# Patient Record
Sex: FEMALE | Race: WHITE
[De-identification: names, ages, dates, MRNs, and addresses within clinical notes are randomized per-mention and may not be internally consistent; named-entity substitution may affect disease eponyms.]

---

## 2017-05-19 ENCOUNTER — HOSPITAL ENCOUNTER (EMERGENCY)
Dept: HOSPITAL 33 - ED | Age: 62
LOS: 1 days | Discharge: HOME | End: 2017-05-20
Payer: MEDICARE

## 2017-05-19 DIAGNOSIS — F33.1: Primary | ICD-10-CM

## 2017-05-19 DIAGNOSIS — Z79.84: ICD-10-CM

## 2017-05-19 DIAGNOSIS — Z79.899: ICD-10-CM

## 2017-05-19 DIAGNOSIS — N39.0: ICD-10-CM

## 2017-05-19 DIAGNOSIS — E11.9: ICD-10-CM

## 2017-05-19 LAB
ALBUMIN SERPL-MCNC: 3.6 G/DL (ref 3.4–5)
ALP SERPL-CCNC: 65 U/L (ref 46–116)
ALT SERPL-CCNC: 41 U/L (ref 12–78)
ANION GAP SERPL CALC-SCNC: 12.5 MEQ/L (ref 5–15)
APAP SPEC-MCNC: 4.8 UG/ML (ref 10–30)
AST SERPL QL: 31 U/L (ref 15–37)
BASOPHILS NFR BLD AUTO: 0.5 % (ref 0–0.4)
BILIRUB BLD-MCNC: 0.4 MG/DL (ref 0.2–1)
BUN SERPL-MCNC: 15 MG/DL (ref 9–20)
CHLORIDE SERPL-SCNC: 104 MEQ/L (ref 98–107)
CO2 SERPL-SCNC: 27.6 MEQ/L (ref 21–32)
COLLECTION TYPE: (no result)
COMPLETE URINE MICROSCOPIC?: YES
GLUCOSE SERPL-MCNC: 97 MG/DL (ref 70–110)
MCH RBC QN AUTO: 30.4 PG (ref 26–32)
NEUTROPHILS NFR BLD AUTO: 65.3 % (ref 36–66)
PLATELET # BLD AUTO: 260 K/MM3 (ref 150–450)
POTASSIUM SERPLBLD-SCNC: 4.8 MEQ/L (ref 3.5–5.1)
PROT SERPL-MCNC: 7.2 GM/DL (ref 6.4–8.2)
RBC # BLD AUTO: 4.73 M/MM3 (ref 4.1–5.4)
SODIUM SERPL-SCNC: 139 MEQ/L (ref 136–145)
WBC # BLD AUTO: 6.1 K/MM3 (ref 4–10.5)
WBC URNS QL MICRO: (no result) /HPF (ref 0–5)

## 2017-05-19 PROCEDURE — 99285 EMERGENCY DEPT VISIT HI MDM: CPT

## 2017-05-19 PROCEDURE — 87086 URINE CULTURE/COLONY COUNT: CPT

## 2017-05-19 PROCEDURE — 80053 COMPREHEN METABOLIC PANEL: CPT

## 2017-05-19 PROCEDURE — 81000 URINALYSIS NONAUTO W/SCOPE: CPT

## 2017-05-19 PROCEDURE — 80307 DRUG TEST PRSMV CHEM ANLYZR: CPT

## 2017-05-19 PROCEDURE — 90791 PSYCH DIAGNOSTIC EVALUATION: CPT

## 2017-05-19 PROCEDURE — 85025 COMPLETE CBC W/AUTO DIFF WBC: CPT

## 2017-05-19 PROCEDURE — 80320 DRUG SCREEN QUANTALCOHOLS: CPT

## 2017-05-19 PROCEDURE — 36415 COLL VENOUS BLD VENIPUNCTURE: CPT

## 2017-05-19 PROCEDURE — 83986 ASSAY PH BODY FLUID NOS: CPT

## 2017-05-19 NOTE — ERPHSYRPT
- History of Present Illness


Source: patient, old records, other (LMD)


Exam Limitations: no limitations


Patient Subjective Stated Complaint: PATIENT STATES FEELING SUICIDAL FOR A 

YEAR.  SAW DR. PETERSON TODAY AND WANTED TO TRY DIFFERENT DEPRESSION MED.  PATIENT 

TOLD DR. PETERSON THAT SHE HAD A PLAN TO CUT HER WRISTS IN THE BATHTUB.


Triage Nursing Assessment: TO ROOM PER SELF.  PATIENT COOPERATIVE, A/O, SKIN W/D

, COLOR NORMAL.  STATES HAS BEEN HAVING SUICIDAL THOUGHTS FOR ONE YEAR AND 

WANTED TO GET MEDS CHANGED TODAY AT DR'S OFFICE.  TOLD DR. PETERSON SHE HAD A PLAN 

TO CUT HER WRISTS AND DR. PETERSON WANTED HER SEEN IN ER


Timing/Duration: today (expressed suicidal plans to LMD and staff), week(s) (

worsening of depression), constant (depression)


Severity of Symptoms-Max: severe


Severity of Symptoms-Current: moderate


Context related to: living circumstances, other (life in general)


Suicidal thoughts: specific plan


Associated Symptoms: depressed


Previous symptoms: same symptoms as today, recently seen


Hx Tetanus, Diphtheria Vaccination/Date Given: No


Hx Influenza Vaccination/Date Given: No


Hx Pneumococcal Vaccination/Date Given: No





<SILAS CALLAHAN - Last Filed: 05/19/17 18:04>





<CRISELDA ANDRADE - Last Filed: 05/19/17 23:27>





- History of Present Illness


Time Seen by Provider: 05/19/17 15:23


Physician History: 





patient brought to ED by office staff from her LMD because she was depressed in 

the office and stated she had a plan - " I'm going to get in the bath tub and 

open my veins ( i.e. cut  my wrists).  She states she has been depressed for 

years; her current meds is not helping and hasn't helped for months; no history 

of suicidal attempts but states she has had the plan for years, just never 

acted on it.  She is not sure if she is rady to act on it now or not; no other 

acute complaints; became more depressed when recently diagnosed with DM


 (SILAS CALLAHAN)


Allergies/Adverse Reactions: 








ibuprofen [From Motrin] Adverse Reaction (Intermediate, Unverified 05/02/12 21:

02)


 abdominal pain


NSAIDS (Non-Steroidal Anti-Inflamma Adverse Reaction (Intermediate, Unverified 

05/02/12 21:03)


 abdominal pain


acetaminophen [From Tylox] Adverse Reaction (Mild, Unverified 05/02/12 21:03)


 Nausea


oxycodone HCl [From Tylox] Adverse Reaction (Mild, Unverified 05/02/12 21:03)


 Nausea





Home Medications: 








Citalopram Hydrobromide 20 mg* [ceLEXa 20 MG] 60 mg PO HS 05/02/12 [History]


Hydrocodone Bit/Acetaminophen [Vicodin Hp Tablet] 1 each PO Q6H PRN PRN 05/02/ 12 [History]


Lisinopril 20 mg PO BID 05/02/12 [History]


Magnesium 400 mg PO DAILY 05/02/12 [History]


Omeprazole 20 MG [Prilosec 20 mg] 40 mg PO DAILY 05/02/12 [History]


Potassium Chloride 20 Meq [Klor-Con 20 MEQ] 40 meq PO BID 05/02/12 [History]


Alprazolam 1 mg*** [Xanax 1 mg***] 1 mg PO QID 05/19/17 [History]


Furosemide 40 mg*** [Lasix 40 MG***] 40 mg PO DAILY 05/19/17 [History]


Metformin HCl 500 mg*** [Glucophage 500 MG***] 500 mg PO BID 05/19/17 [History]








- Past Medical History


Pertinent Past Medical History: Yes


Cardiac History: Hypertension


Endocrine Medical History: Diabetes Type II


Musculoskeletal History: Osteoarthritis, Other


Psycho-Social History: Anxiety, Depression


Other Medical History: Chronic Pain





- Past Surgical History


Past Surgical History: Yes


Gastrointestinal: Cholecystectomy


Musculoskeletal: Orthopedic Surgery


Other Surgical History: BOTH KNEES





- Social History


Smoking Status: Current every day smoker


How long have you smoked: 22 years


Exposure to second hand smoke: Yes


Alcohol Use: None


Drug Use: none


Patient Lives Alone: Yes


Significant Family History: no pertinent family hx





- Female History


Hx Pregnant Now: No





<SILAS CALLAHAN - Last Filed: 05/19/17 18:04>





- Review of Systems


Constitutional: No Symptoms


Eyes: No Symptoms


Ears, Nose, & Throat: No Symptoms


Respiratory: No Cough, No Dyspnea, No Wheezing


Cardiac: No Chest Pain, No Palpitations, No Syncope


Abdominal/Gastrointestinal: No Abdominal Pain, No Nausea, No Vomiting, No 

Diarrhea


Genitourinary Symptoms: No Dysuria, No Frequency, No Hematuria


Musculoskeletal: No Symptoms


Skin: No Symptoms


Neurological: No Symptoms


Psychological: Depression, Suicidal Ideations, No Alcohol Abuse, No Drug Abuse, 

No Anxiety, No Hallucinations


Endocrine: Polyuria, Polydipsia


Hematologic/Lymphatic: No Symptoms


Immunological/Allergic: No Symptoms





<SILAS CALLAHAN - Last Filed: 05/19/17 18:04>





- Physical Exam


General Appearance: mild distress, alert


Eyes, Ears, Nose, Throat Exam: normal ENT inspection, TMs normal, pharynx normal

, moist mucous membranes


Neck Exam: normal inspection, non-tender, supple, full range of motion, No 

Kernig's, No JVD


Respiratory Exam: normal breath sounds, lungs clear, airway intact, No chest 

tenderness, No respiratory distress


Cardiovascular Exam: regular rate/rhythm, normal heart sounds, normal 

peripheral pulses, capillary refill <2 sec, No murmur


Gastrointestinal/Abdominal Exam: soft, normal bowel sounds, No tenderness, No 

guarding, No rebound, No organomegaly


Extremities Exam: normal inspection, normal range of motion, No evidence of 

injury, No edema


Peripheral Pulses: carotid (R): 4+, carotid (L): 4+, femoral (R): 4+, femoral (L

): 4+


Current Suicidality: has suicide plan


Neurological Exam: alert, calm, CNs II-XII nml as tested, oriented x 3, 

responds to pain, depressed affect


Appearance: appropriate appearance, appropriate insight, no memory impairment


Behavior/Eye Contact/Speech: alert & cooperative, cooperative, good eye contact

, normal speech


Thoughts/Hallucinations: normal thought pattern, no apparent hallucination, No 

delusions, No flight of ideas, No grandiose, No paranoid


Skin Exam: normal color, warm, dry, No rash


**SpO2 Interpretation**: normal


SpO2: 98


Oxygen Delivery: Room Air





<SILAS CALLAHAN - Last Filed: 05/19/17 18:04>





- Course


Nursing assessment & vital signs reviewed: Yes





<SILAS CALLAHAN - Last Filed: 05/19/17 18:04>





<CRISELDA ANDRADE - Last Filed: 05/19/17 23:27>


Ordered Tests: 


 Active Orders 24 hr











 Category Date Time Status


 


 Accucheck STAT Care  05/19/17 15:22 Completed


 


 Psychiatric Evaluation STAT Care  05/19/17 15:22 Active


 


 Re-Check Vital Signs STAT Care  05/19/17 15:22 Completed


 


 Regular Diet Diet  05/19/17 Breakfast Active


 


 ACETAMINOPHEN Stat Lab  05/19/17 15:43 Completed


 


 CBC W DIFF Stat Lab  05/19/17 15:43 Completed


 


 CMP Stat Lab  05/19/17 15:43 Completed


 


 CULTURE,URINE Stat Lab  05/19/17 15:22 Received


 


 Ethyl Alcohol,Urine Stat Lab  05/19/17 15:35 Completed


 


 SALICYLATE Stat Lab  05/19/17 15:43 Completed


 


 UA W/ MICROSCOPIC Stat Lab  05/19/17 15:35 Completed


 


 Urine Triage Profile Stat Lab  05/19/17 15:35 Completed








Medication Summary











Generic Name Dose Route Start Last Admin





  Trade Name Freq  PRN Reason Stop Dose Admin


 


Nitrofurantoin Macrocrystals  100 mg  05/19/17 23:21  





  Macrobid 100mg Capsule***  PO  05/19/17 23:22  





  STAT ONE   











Lab/Rad Data: 


 Laboratory Result Diagrams





 05/19/17 15:43 





 05/19/17 15:43 





 Laboratory Results











  05/19/17 05/19/17 05/19/17 Range/Units





  15:43 15:43 15:35 


 


WBC   6.1   (4.0-10.5)  K/mm3


 


RBC   4.73   (4.1-5.4)  M/mm3


 


Hgb   14.4   (12.0-16.0)  gm/dl


 


Hct   45.4   (35-47)  %


 


MCV   96.0   ()  fl


 


MCH   30.4   (26-32)  pg


 


MCHC   31.7 L   (32-36)  g/dl


 


RDW   13.4   (11.5-14.0)  %


 


Plt Count   260   (150-450)  K/mm3


 


MPV   9.2   (6-9.5)  fl


 


Gran %   65.3   (36.0-66.0)  %


 


Lymphocytes %   23.0 L   (24.0-44.0)  %


 


Monocytes %   8.8   (0.0-12.0)  %


 


Eosinophils %   2.4   (0.00-5.0)  %


 


Basophils %   0.5   (0.0-0.4)  %


 


Basophils #   0.03   (0-0.4)  


 


Sodium  139    (136-145)  mEq/L


 


Potassium  4.8    (3.5-5.1)  mEq/L


 


Chloride  104    ()  mEq/L


 


Carbon Dioxide  27.6    (21-32)  mEq/L


 


Anion Gap  12.5    (5-15)  MEQ/L


 


BUN  15    (9-20)  mg/dL


 


Creatinine  1.10    (0.55-1.30)  mg/dl


 


Estimated GFR  54    ML/MIN


 


Glucose  97    ()  MG/DL


 


Calcium  10.3 H    (8.5-10.1)  mg/dL


 


Total Bilirubin  0.4    (0.2-1.0)  mg/dL


 


AST  31    (15-37)  U/L


 


ALT  41    (12-78)  U/L


 


Alkaline Phosphatase  65    ()  U/L


 


Serum Total Protein  7.2    (6.4-8.2)  gm/dL


 


Albumin  3.6    (3.4-5.0)  g/dL


 


Ur Collection Type     


 


Urine Color     (YELLOW)  


 


Urine Appearance     (CLEAR)  


 


Urine pH    5.5  (5-6)  


 


Ur Specific Gravity     (1.005-1.025)  


 


Urine Protein     (Negative)  


 


Urine Glucose (UA)     (NEGATIVE)  mg/dL


 


Urine Ketones     (NEGATIVE)  


 


Urine Nitrite     (NEGATIVE)  


 


Urine Bilirubin     (NEGATIVE)  


 


Urine Urobilinogen     (0-1)  mg/dL


 


Urine WBC (Auto)     (NEGATIVE)  


 


Urine RBC (Auto)     (0-5)  Amado/ul


 


Urine Microscopic RBC     (0-2)  /HPF


 


Urine Microscopic WBC     (0-5)  /HPF


 


Ur Epithelial Cells     (FEW)  /HPF


 


Urine Bacteria     (NEGATIVE)  /HPF


 


Urine Yeast     (NEGATIVE)  /HPF


 


Salicylates  4.0    (2.8-20.0)  mg/dl


 


Urine Opiates Level     (NEGATIVE)  


 


Ur Methadone     (NEGATIVE)  


 


Acetaminophen  4.8 L    (10-30)  ug/ml


 


Urine Barbiturates     (NEGATIVE)  


 


Ur Phencyclidine (PCP)     (NEGATIVE)  


 


Urine Amphetamine     (NEGATIVE)  


 


U Benzodiazepine Level     (NEGATIVE)  


 


Urine Cocaine     (NEGATIVE)  


 


Urine Marijuana (THC)     (NEGATIVE)  


 


Urine Ethyl Alcohol    3  (0.00-20)  mg/dl


 


Specimen Received     














  05/19/17 05/19/17 Range/Units





  15:35 15:35 


 


WBC    (4.0-10.5)  K/mm3


 


RBC    (4.1-5.4)  M/mm3


 


Hgb    (12.0-16.0)  gm/dl


 


Hct    (35-47)  %


 


MCV    ()  fl


 


MCH    (26-32)  pg


 


MCHC    (32-36)  g/dl


 


RDW    (11.5-14.0)  %


 


Plt Count    (150-450)  K/mm3


 


MPV    (6-9.5)  fl


 


Gran %    (36.0-66.0)  %


 


Lymphocytes %    (24.0-44.0)  %


 


Monocytes %    (0.0-12.0)  %


 


Eosinophils %    (0.00-5.0)  %


 


Basophils %    (0.0-0.4)  %


 


Basophils #    (0-0.4)  


 


Sodium    (136-145)  mEq/L


 


Potassium    (3.5-5.1)  mEq/L


 


Chloride    ()  mEq/L


 


Carbon Dioxide    (21-32)  mEq/L


 


Anion Gap    (5-15)  MEQ/L


 


BUN    (9-20)  mg/dL


 


Creatinine    (0.55-1.30)  mg/dl


 


Estimated GFR    ML/MIN


 


Glucose    ()  MG/DL


 


Calcium    (8.5-10.1)  mg/dL


 


Total Bilirubin    (0.2-1.0)  mg/dL


 


AST    (15-37)  U/L


 


ALT    (12-78)  U/L


 


Alkaline Phosphatase    ()  U/L


 


Serum Total Protein    (6.4-8.2)  gm/dL


 


Albumin    (3.4-5.0)  g/dL


 


Ur Collection Type   VOID  


 


Urine Color   YELLOW  (YELLOW)  


 


Urine Appearance   SLIGHTLY CLOUDY  (CLEAR)  


 


Urine pH   5.5  (5-6)  


 


Ur Specific Gravity   1.025  (1.005-1.025)  


 


Urine Protein   TRACE  (Negative)  


 


Urine Glucose (UA)   500  (NEGATIVE)  mg/dL


 


Urine Ketones   NEGATIVE  (NEGATIVE)  


 


Urine Nitrite   NEGATIVE  (NEGATIVE)  


 


Urine Bilirubin   NEGATIVE  (NEGATIVE)  


 


Urine Urobilinogen   0.2  (0-1)  mg/dL


 


Urine WBC (Auto)   NEGATIVE  (NEGATIVE)  


 


Urine RBC (Auto)   TRACE-INTACT  (0-5)  Amado/ul


 


Urine Microscopic RBC   0-2  (0-2)  /HPF


 


Urine Microscopic WBC   15-25  (0-5)  /HPF


 


Ur Epithelial Cells   MANY  (FEW)  /HPF


 


Urine Bacteria   MODERATE  (NEGATIVE)  /HPF


 


Urine Yeast   FEW  (NEGATIVE)  /HPF


 


Salicylates    (2.8-20.0)  mg/dl


 


Urine Opiates Level  POS.   (NEGATIVE)  


 


Ur Methadone  NEG.   (NEGATIVE)  


 


Acetaminophen    (10-30)  ug/ml


 


Urine Barbiturates  NEG.   (NEGATIVE)  


 


Ur Phencyclidine (PCP)  NEG.   (NEGATIVE)  


 


Urine Amphetamine  NEG.   (NEGATIVE)  


 


U Benzodiazepine Level  POS.   (NEGATIVE)  


 


Urine Cocaine  NEG.   (NEGATIVE)  


 


Urine Marijuana (THC)  NEG.   (NEGATIVE)  


 


Urine Ethyl Alcohol    (0.00-20)  mg/dl


 


Specimen Received   05/19/17 1497  








reviewed


 (SILAS CALLAHAN)





- Progress


Progress: re-examined (after labs)


Discussed with .: Other (Psyche consult requested)


Counseled pt/family regarding: lab results, diagnosis, need for follow-up





<SILAS CALLAHAN - Last Filed: 05/19/17 18:04>





<CRISELDA ANDRADE - Last Filed: 05/19/17 23:27>





- Progress


Progress Note: 





05/19/17 18:11


no cahnge on recheck; labs ok; still has plan but doesn't plan on executing it 

at this time; psyche consult pending; disposition will be dependent on their 

recommendation; Dr Andrade here and will assume her care. (SILAS CALLAHAN)








05/19/17 22:50


Pt was initially seen per Dr Callahan. She is currently still speaking with 

[facility] behavioralist via teleconsult.





05/19/17 23:22


Spoke to Marcelina at . She advised pt stable for release. Needs psychiatrist 

follow up. Options for a psychiatrist given. Will Rx uti as well. Pt 

comfortable going home.


 (CRISELDA ANDRADE)





- Departure


Critical Care Time: No





<SILAS CALLAHAN - Last Filed: 05/19/17 18:04>





- Departure


Time of Disposition: 23:24


Departure Disposition: Home


Critical Care Time: No





<CRISELDA ANDRADE - Last Filed: 05/19/17 23:27>





- Departure


Clinical Impression: 


 UTI (urinary tract infection)





Depression


Qualifiers:


 Depression Type: major depressive disorder Major depression recurrence: 

recurrent Active/Remission status: currently active Major depression episode 

severity: moderate Qualified Code(s): F33.1 - Major depressive disorder, 

recurrent, moderate





Condition: Stable


Referrals: 


AHVEN PETERSON MD [Primary Care Provider] - 


IZZY YEE [NON-STAFF PHY W/O PRIVILEGES] - 


POONAM COLON MD [NON-STAFF PHY W/O PRIVILEGES] - 


Instructions:  Depression -- Adult


Additional Instructions: 


Rx macrobid for UTI.


Follow up with Dr Peterson.


Stay with family reno.





Call [phone] crisis line or [phone] to speak to a counsellor 

anytime.





Arrange follow up with a psychiatrist. Dr Yee [phone] or Dr Barbosa at 

[facility] are possible options.





Return for problems or concerns.





Prescriptions: 


Nitrofurantoin Macro 100 mg*** [Macrobid 100MG Capsule***] 100 mg PO BID #14 

capsule

## 2017-05-20 VITALS — OXYGEN SATURATION: 96 % | SYSTOLIC BLOOD PRESSURE: 112 MMHG | HEART RATE: 65 BPM | DIASTOLIC BLOOD PRESSURE: 65 MMHG

## 2018-05-08 ENCOUNTER — HOSPITAL ENCOUNTER (EMERGENCY)
Dept: HOSPITAL 33 - ED | Age: 63
LOS: 1 days | Discharge: ANOTHER HOSPITAL | End: 2018-05-09
Payer: MEDICARE

## 2018-05-08 DIAGNOSIS — E11.9: ICD-10-CM

## 2018-05-08 DIAGNOSIS — F32.9: Primary | ICD-10-CM

## 2018-05-08 DIAGNOSIS — R45.851: ICD-10-CM

## 2018-05-08 DIAGNOSIS — Z79.899: ICD-10-CM

## 2018-05-08 DIAGNOSIS — I10: ICD-10-CM

## 2018-05-08 DIAGNOSIS — Z79.84: ICD-10-CM

## 2018-05-08 LAB
ALBUMIN SERPL-MCNC: 4.1 G/DL (ref 3.5–5)
ALP SERPL-CCNC: 54 U/L (ref 38–126)
ALT SERPL-CCNC: 28 U/L (ref 0–35)
AMPHETAMINES UR QL: NEGATIVE
ANION GAP SERPL CALC-SCNC: 12.8 MEQ/L (ref 5–15)
APAP SPEC-MCNC: < 10 UG/ML (ref 10–30)
AST SERPL QL: 30 U/L (ref 14–36)
BARBITURATES UR QL: NEGATIVE
BASOPHILS # BLD AUTO: 0.06 10*3/UL (ref 0–0.4)
BASOPHILS NFR BLD AUTO: 1 % (ref 0–0.4)
BENZODIAZ UR QL SCN: POSITIVE
BILIRUB BLD-MCNC: 0.3 MG/DL (ref 0.2–1.3)
BUN SERPL-MCNC: 19 MG/DL (ref 7–17)
CALCIUM SPEC-MCNC: 10.6 MG/DL (ref 8.4–10.2)
CHLORIDE SERPL-SCNC: 101 MMOL/L (ref 98–107)
CO2 SERPL-SCNC: 28 MMOL/L (ref 22–30)
COCAINE UR QL SCN: NEGATIVE
CREAT SERPL-MCNC: 0.93 MG/DL (ref 0.52–1.04)
EOSINOPHIL # BLD AUTO: 0.14 10*3/UL (ref 0–0.5)
ETHANOL SERPL-MCNC: < 10 MG/DL (ref 0–10)
GLUCOSE SERPL-MCNC: 116 MG/DL (ref 74–106)
GRANULOCYTES # BLD AUTO: 3.39 10*3/UL (ref 1.4–6.9)
HCT VFR BLD AUTO: 43.8 % (ref 35–47)
HGB BLD-MCNC: 14.2 GM/DL (ref 12–16)
LYMPHOCYTES # SPEC AUTO: 1.85 10*3/UL (ref 1–4.6)
MCH RBC QN AUTO: 29.9 PG (ref 26–32)
MCHC RBC AUTO-ENTMCNC: 32.4 G/DL (ref 32–36)
METHADONE UR QL: NEGATIVE
MONOCYTES # BLD AUTO: 0.44 10*3/UL (ref 0–1.3)
NEUTROPHILS NFR BLD AUTO: 57.6 % (ref 36–66)
OPIATES UR QL: POSITIVE
PCP UR QL CFM>20 NG/ML: NEGATIVE
PLATELET # BLD AUTO: 305 K/MM3 (ref 150–450)
POTASSIUM SERPLBLD-SCNC: 4.7 MMOL/L (ref 3.5–5.1)
PROT SERPL-MCNC: 7.3 G/DL (ref 6.3–8.2)
RBC # BLD AUTO: 4.75 M/MM3 (ref 4.1–5.4)
SALICYLATES SERPL-MCNC: < 1 MG/DL (ref 2–20)
SODIUM SERPL-SCNC: 137 MMOL/L (ref 137–145)
THC UR QL SCN: NEGATIVE
WBC # BLD AUTO: 5.9 K/MM3 (ref 4–10.5)

## 2018-05-08 PROCEDURE — 99285 EMERGENCY DEPT VISIT HI MDM: CPT

## 2018-05-08 PROCEDURE — 36415 COLL VENOUS BLD VENIPUNCTURE: CPT

## 2018-05-08 PROCEDURE — 90791 PSYCH DIAGNOSTIC EVALUATION: CPT

## 2018-05-08 PROCEDURE — 85025 COMPLETE CBC W/AUTO DIFF WBC: CPT

## 2018-05-08 PROCEDURE — 93005 ELECTROCARDIOGRAM TRACING: CPT

## 2018-05-08 PROCEDURE — 80307 DRUG TEST PRSMV CHEM ANLYZR: CPT

## 2018-05-08 PROCEDURE — G0480 DRUG TEST DEF 1-7 CLASSES: HCPCS

## 2018-05-08 PROCEDURE — 80053 COMPREHEN METABOLIC PANEL: CPT

## 2018-05-08 NOTE — ERPHSYRPT
- History of Present Illness


Source: patient


Hx Tetanus, Diphtheria Vaccination/Date Given: No


Hx Influenza Vaccination/Date Given: No


Hx Pneumococcal Vaccination/Date Given: No





<ANDREEA MARIANO - Last Filed: 05/08/18 18:44>





<ISAMAR CARLISLE - Last Filed: 05/08/18 23:39>





- History of Present Illness


Time Seen by Provider: 05/08/18 15:49


Physician History: 





mild to mod suicidal ideation today, hx depression, plans to hurt herself w/ 

razor blades, poor eye contact, speech fluent, denies any pain (ANDREEA MARIANO)


Allergies/Adverse Reactions: 








ibuprofen [From Motrin] Adverse Reaction (Intermediate, Verified 05/08/18 16:32)


 abdominal pain


NSAIDS (Non-Steroidal Anti-Inflamma Adverse Reaction (Intermediate, Verified 05/ 08/18 16:32)


 abdominal pain


acetaminophen [From Tylox] Adverse Reaction (Mild, Verified 05/08/18 16:32)


 Nausea


oxycodone HCl [From Tylox] Adverse Reaction (Mild, Verified 05/08/18 16:32)


 Nausea





Home Medications: 








Citalopram Hydrobromide 20 mg* [ceLEXa 20 MG] 60 mg PO HS 05/02/12 [History]


Hydrocodone Bit/Acetaminophen [Vicodin Hp Tablet] 1 each PO Q6H PRN PRN 05/02/ 12 [History]


Lisinopril 20 mg PO BID 05/02/12 [History]


Omeprazole 20 MG [Prilosec 20 mg] 40 mg PO DAILY 05/02/12 [History]


Potassium Chloride 20 Meq [Klor-Con 20 MEQ] 40 meq PO BID 05/02/12 [History]


Alprazolam 1 mg*** [Xanax 1 mg***] 1 mg PO QID 05/19/17 [History]


Metformin HCl 500 mg*** [Glucophage 500 MG***] 500 mg PO BID 05/19/17 [History]


Dapagliflozin Propanediol [Farxiga] 10 mg PO DAILY 05/08/18 [History]


Potassium Chloride [Klor-Con M10] 10 meq PO DAILY 05/08/18 [History]


Pravastatin Sodium [Pravastatin Sodium] 40 mg PO DAILY 05/08/18 [History]


Quetiapine Fumarate [Quetiapine Fumarate] 50 mg PO HS 05/08/18 [History]


hydroCHLOROthiazide [Hydrochlorothiazide] 12.5 mg PO DAILY 05/08/18 [History]








- Past Medical History


Pertinent Past Medical History: Yes


Cardiac History: Hypertension


Endocrine Medical History: Diabetes Type II


Musculoskeletal History: Osteoarthritis, Other


Psycho-Social History: Anxiety, Depression


Other Medical History: Chronic Pain





- Past Surgical History


Past Surgical History: Yes


Gastrointestinal: Cholecystectomy


Musculoskeletal: Orthopedic Surgery


Other Surgical History: BOTH KNEES





- Social History


Smoking Status: Current every day smoker


How long have you smoked: 22 years


Exposure to second hand smoke: Yes


Alcohol Use: None


Drug Use: none


Patient Lives Alone: Yes


Significant Family History: no pertinent family hx





<ANDREEA MARIANO - Last Filed: 05/08/18 18:44>





- Review of Systems


Constitutional: No Fever


Ears, Nose, & Throat: No Mouth Pain


Respiratory: No Dyspnea


Cardiac: No Chest Pain


Abdominal/Gastrointestinal: No Abdominal Pain


Musculoskeletal: No Back Pain, No Neck Pain


Skin: No Skin Lesions


Neurological: No Dizziness, No Focal Weakness, No Headache


Psychological: Depression, Suicidal Ideations





<ANDREEA MARIANO - Last Filed: 05/08/18 18:44>





- Physical Exam


General Appearance: no apparent distress


Eyes, Ears, Nose, Throat Exam: moist mucous membranes


Neck Exam: non-tender


Respiratory Exam: normal breath sounds


Cardiovascular Exam: regular rate/rhythm


Gastrointestinal/Abdominal Exam: soft, No tenderness


Neurological Exam: alert, oriented x 3, flat


Appearance: appropriate appearance


Behavior/Eye Contact/Speech: cooperative, normal speech


Thoughts/Hallucinations: No auditory hallucinations


Skin Exam: warm, dry





<ANDREEA MARIANO - Last Filed: 05/08/18 18:44>





- Nursing Vital Signs


Nursing Vital Signs: 


 Initial Vital Signs











Temperature  99.2 F   05/08/18 15:56


 


Pulse Rate  85   05/08/18 15:56


 


Respiratory Rate  16   05/08/18 15:56


 


Blood Pressure  149/110   05/08/18 15:56


 


O2 Sat by Pulse Oximetry  94 L  05/08/18 15:56








 Pain Scale











Pain Intensity                 0














Ordered Tests: 


 Active Orders 24 hr











 Category Date Time Status


 


 EKG-ER Only STAT Care  05/08/18 15:45 Active


 


 ACETAMINOPHEN Stat Lab  05/08/18 16:20 Completed


 


 CBC W DIFF Stat Lab  05/08/18 16:20 Completed


 


 CMP Stat Lab  05/08/18 16:20 Completed


 


 ETHYL ALCOHOL Stat Lab  05/08/18 16:20 Completed


 


 SALICYLATE Stat Lab  05/08/18 16:20 Completed


 


 Urine Triage Profile Stat Lab  05/08/18 16:25 Completed











Lab/Rad Data: 


 Laboratory Result Diagrams





 05/08/18 16:20 





 05/08/18 16:20 





 Laboratory Results











  05/08/18 05/08/18 05/08/18 Range/Units





  16:25 16:20 16:20 


 


WBC    5.9  (4.0-10.5)  K/mm3


 


RBC    4.75  (4.1-5.4)  M/mm3


 


Hgb    14.2  (12.0-16.0)  gm/dl


 


Hct    43.8  (35-47)  %


 


MCV    92.2  ()  fl


 


MCH    29.9  (26-32)  pg


 


MCHC    32.4  (32-36)  g/dl


 


RDW    13.2  (11.5-14.0)  %


 


Plt Count    305  (150-450)  K/mm3


 


MPV    9.0  (6-9.5)  fl


 


Gran %    57.6  (36.0-66.0)  %


 


Eos # (Auto)    0.14  (0-0.5)  


 


Absolute Lymphs (auto)    1.85  (1.0-4.6)  


 


Absolute Monos (auto)    0.44  (0.0-1.3)  


 


Lymphocytes %    31.5  (24.0-44.0)  %


 


Monocytes %    7.5  (0.0-12.0)  %


 


Eosinophils %    2.4  (0.00-5.0)  %


 


Basophils %    1.0  (0.0-0.4)  %


 


Absolute Granulocytes    3.39  (1.4-6.9)  


 


Basophils #    0.06  (0-0.4)  


 


Sodium   137   (137-145)  mmol/L


 


Potassium   4.7   (3.5-5.1)  mmol/L


 


Chloride   101   ()  mmol/L


 


Carbon Dioxide   28   (22-30)  mmol/L


 


Anion Gap   12.8   (5-15)  MEQ/L


 


BUN   19 H   (7-17)  mg/dL


 


Creatinine   0.93   (0.52-1.04)  mg/dL


 


Estimated GFR   > 60.0   ML/MIN


 


Glucose   116 H   ()  mg/dL


 


Calcium   10.6 H   (8.4-10.2)  mg/dL


 


Total Bilirubin   0.30   (0.2-1.3)  mg/dL


 


AST   30   (14-36)  U/L


 


ALT   28   (0-35)  U/L


 


Alkaline Phosphatase   54   ()  U/L


 


Serum Total Protein   7.3   (6.3-8.2)  g/dL


 


Albumin   4.1   (3.5-5.0)  g/dL


 


Salicylates   < 1.0 L   (2-20)  mg/dL


 


Urine Opiates Level  POSITIVE    (NEGATIVE)  


 


Ur Methadone  NEGATIVE    (NEGATIVE)  


 


Acetaminophen   < 10 L   (10-30)  ug/ml


 


Urine Barbiturates  NEGATIVE    (NEGATIVE)  


 


Ur Phencyclidine (PCP)  NEGATIVE    (NEGATIVE)  


 


Urine Amphetamine  NEGATIVE    (NEGATIVE)  


 


U Benzodiazepine Level  POSITIVE    (NEGATIVE)  


 


Urine Cocaine  NEGATIVE    (NEGATIVE)  


 


Urine Marijuana (THC)  NEGATIVE    (NEGATIVE)  


 


Ethyl Alcohol   < 10   (0-10)  mg/dL














<ANDREEA MARIANO - Last Filed: 05/08/18 18:44>





- Progress


Progress: unchanged


Counseled pt/family regarding: diagnosis, need for follow-up





<ISAMAR CARLISLE - Last Filed: 05/08/18 23:39>





- Progress


Progress Note: 





05/08/18 18:44


care to Dr Carlisle at 19:00 (ANDREEA MARIANO)








05/08/18 19:10


Pt care discussed and care accepted from Dr Mariano at 19:00.


05/08/18 23:38


Pt detained by emergency nursing home signed by Judge KELI Howard.  (ISAMAR CARLISLE)





<ANDREEA MARIANO - Last Filed: 05/08/18 18:44>





- Departure


Time of Disposition: 23:32


Departure Disposition: Transfer (transfer by emergency nursing home per Judge ROB Howard to [facility] per Dr Curry.)


Critical Care Time: No





<ISAMAR CARLISLE - Last Filed: 05/08/18 23:39>





- Departure


Clinical Impression: 


 Depression, Suicidal ideations





Condition: Fair


Referrals: 


HAVEN RESTREPO MD [Primary Care Provider] -

## 2018-05-09 VITALS — HEART RATE: 57 BPM | SYSTOLIC BLOOD PRESSURE: 119 MMHG | DIASTOLIC BLOOD PRESSURE: 67 MMHG | OXYGEN SATURATION: 97 %

## 2019-07-20 ENCOUNTER — HOSPITAL ENCOUNTER (EMERGENCY)
Dept: HOSPITAL 33 - ED | Age: 64
Discharge: HOME | End: 2019-07-20
Payer: MEDICARE

## 2019-07-20 VITALS — SYSTOLIC BLOOD PRESSURE: 169 MMHG | DIASTOLIC BLOOD PRESSURE: 76 MMHG | HEART RATE: 79 BPM

## 2019-07-20 VITALS — OXYGEN SATURATION: 97 %

## 2019-07-20 DIAGNOSIS — Z79.899: ICD-10-CM

## 2019-07-20 DIAGNOSIS — I16.0: Primary | ICD-10-CM

## 2019-07-20 PROCEDURE — 99283 EMERGENCY DEPT VISIT LOW MDM: CPT

## 2019-07-20 NOTE — ERPHSYRPT
- History of Present Illness


Time Seen by Provider: 07/20/19 11:45


Source: patient


Exam Limitations: no limitations


Patient Subjective Stated Complaint: Pt stated that her blood pressure has been 

running high since March and today she got up and it was 186/118 and so she 

came right to the ER and did not take her blood pressure medicine, asymptomatic


Triage Nursing Assessment: Pt walked into the ER, tremors in hands that she 

states is normal, /86, all other vitals wnl, pulses normal, normal S1-S2 

sounds heard, denies pain, skin normal warm and dry, doesn't appear to be in 

any distress


Physician History: 





Pt stated that her blood pressure has been running high since March and today 

she got up and it was 186/118 and so she came right to the ER and did not take 

her blood pressure medicine. She denies any symptoms


Timing/Duration: today


Aspirin Treatment Today: 81 mg x 1


Associated Symptoms: denies symptoms


Allergies/Adverse Reactions: 








methadone Allergy (Verified 07/20/19 11:27)


 


ibuprofen [From Motrin] Adverse Reaction (Intermediate, Verified 05/08/18 16:32)


 abdominal pain


NSAIDS (Non-Steroidal Anti-Inflamma Adverse Reaction (Intermediate, Verified 05/ 08/18 16:32)


 abdominal pain


acetaminophen [From Tylox] Adverse Reaction (Mild, Verified 05/08/18 16:32)


 Nausea


oxycodone HCl [From Tylox] Adverse Reaction (Mild, Verified 05/08/18 16:32)


 Nausea





Home Medications: 








Hydrocodone Bit/Acetaminophen [Vicodin Hp Tablet] 1 each PO BID 05/02/12 [

History]


Lisinopril 20 mg PO BID 05/02/12 [History]


Omeprazole 20 MG [Prilosec 20 mg] 40 mg PO DAILY 05/02/12 [History]


Alprazolam 1 mg*** [Xanax 1 mg***] 1 mg PO HS 05/19/17 [History]


Metformin HCl 500 mg*** [Glucophage 500 MG***] 500 mg PO BID 05/19/17 [History]


Dapagliflozin Propanediol [Farxiga] 10 mg PO DAILY 05/08/18 [History]


Potassium Chloride [Klor-Con M10] 10 meq PO DAILY 05/08/18 [History]


Amitriptyline HCl 50 mg PO HS 07/20/19 [History]


Atorvastatin Calcium [Lipitor] 40 mg PO DAILY 07/20/19 [History]


Fluoxetine HCl 40 mg PO DAILY 07/20/19 [History]





Hx Tetanus, Diphtheria Vaccination/Date Given: No


Hx Influenza Vaccination/Date Given: No


Hx Pneumococcal Vaccination/Date Given: No





- Review of Systems


Constitutional: No Fever, No Chills


Eyes: No Symptoms


Ears, Nose, & Throat: No Symptoms


Respiratory: No Cough, No Dyspnea


Cardiac: No Chest Pain, No Edema, No Syncope


Abdominal/Gastrointestinal: No Abdominal Pain, No Nausea, No Vomiting, No 

Diarrhea


Genitourinary Symptoms: No Dysuria


Musculoskeletal: No Back Pain, No Neck Pain


Skin: No Rash


Neurological: No Dizziness, No Focal Weakness, No Sensory Changes


Psychological: No Symptoms


Endocrine: No Symptoms


All Other Systems: Reviewed and Negative





- Past Medical History


Pertinent Past Medical History: Yes


Cardiac History: Hypertension


Endocrine Medical History: Diabetes Type II


Musculoskeletal History: Osteoarthritis, Other


Psycho-Social History: Anxiety, Depression


Other Medical History: Chronic Pain





- Past Surgical History


Past Surgical History: Yes


Gastrointestinal: Cholecystectomy


Musculoskeletal: Orthopedic Surgery


Other Surgical History: BOTH KNEES





- Social History


Smoking Status: Former smoker


How long have you smoked: 22 years


Exposure to second hand smoke: Yes


Alcohol Use: None


Drug Use: none


Patient Lives Alone: Yes


Significant Family History: no pertinent family hx





- Female History


Hx Pregnant Now: No





- Nursing Vital Signs


Nursing Vital Signs: 


 Initial Vital Signs











Temperature  98.2 F   07/20/19 11:17


 


Pulse Rate  92 H  07/20/19 11:17


 


Blood Pressure  182/86   07/20/19 11:17


 


O2 Sat by Pulse Oximetry  97   07/20/19 11:17








 Pain Scale











Pain Intensity                 0

















- Physical Exam


General Appearance: no apparent distress, alert


Eye Exam: PERRL/EOMI, eyes nml inspection


Ears, Nose, Throat Exam: normal ENT inspection, moist mucous membranes


Neck Exam: normal inspection, non-tender, supple


Respiratory Exam: normal breath sounds, lungs clear, No respiratory distress


Cardiovascular Exam: regular rate/rhythm, normal heart sounds, No edema


Gastrointestinal/Abdomen Exam: soft, No tenderness, No mass


Back Exam: normal inspection, No CVA tenderness, No vertebral tenderness


Extremity Exam: normal inspection, normal range of motion


Neurologic Exam: alert, oriented x 3, cooperative, normal mood/affect, nml 

cerebellar function, sensation nml, No motor deficits


Skin Exam: normal color, warm, dry


Lymphatic Exam: No adenopathy


SpO2: 97





- Course


Nursing assessment & vital signs reviewed: Yes


Ordered Tests: 


 Active Orders 24 hr











 Category Date Time Status


 


 Vital Signs .[id number],q1hx2,[id number] Care  07/20/19 12:13 Active








Medication Summary














Discontinued Medications














Generic Name Dose Route Start Last Admin





  Trade Name Renetta  PRN Reason Stop Dose Admin


 


Clonidine  0.1 mg  07/20/19 11:44  07/20/19 11:58





  Catapres 0.1 Mg***  PO  07/20/19 11:45  0.1 mg





  STAT ONE   Administration





     





     





     





     


 


Clonidine  Confirm  07/20/19 11:48  





  Catapres 0.1 Mg***  Administered  07/20/19 11:49  





  Dose   





  0.1 mg   





  .ROUTE   





  .STK-MED ONE   





     





     





     





     


 


Lisinopril 20 mg/  0 mg  07/20/19 11:44  07/20/19 11:58





  Hydrochlorothiazide 25 mg  PO  07/20/19 11:45  45 mg





  DAILY STA   Administration





     





     





     





     


 


Hydrochlorothiazide  Confirm  07/20/19 11:52  





  Hydrodiuril 25 Mg***  Administered  07/20/19 11:53  





  Dose   





  25 mg   





  .ROUTE   





  .STK-MED ONE   





     





     





     





     


 


Lisinopril  Confirm  07/20/19 11:52  





  Zestril 20 Mg***  Administered  07/20/19 11:53  





  Dose   





  20 mg   





  .ROUTE   





  .STK-MED ONE   





     





     





     





     














- Progress


Progress: improved


Air Movement: good


Blood Culture(s) Obtained: No


Antibiotics given: No


Counseled pt/family regarding: diagnosis, need for follow-up





- Departure


Departure Disposition: Home


Clinical Impression: 


 Hypertensive urgency





Condition: Stable


Critical Care Time: No


Referrals: 


HAVEN RESTREPO MD [Primary Care Provider] - 


Instructions:  Malignant Hypertension (DC)


Additional Instructions: 


take 20 mg lisinopril at 7 PM today. Start lisinopril 20 mg tomorrow AM,  dont 

try to get up too quickly otherwise it will cause you dizziness. follow up with 

Dr Syed in 2-3 days


Discharge/Care Plan





DIEGO AVERY was seen on 07/20/19 in the Emergency Room. The patient was 

counseled regarding Diagnosis,Lab results, Imaging studies, need for follow up 

and when to return to the Emergency Room.





Prescriptions given:





Discharge Note





I have spoken with the patient and/or caregivers. I have explained the patient'

s condition, diagnosis and treatment plan based on the information available to 

me at this time. I have answered the patient's and/or caregiver's questions and 

addressed any concerns. The patient and/or caregivers have as good 

understanding of the patient's diagnosis, condition and treatment plan as can 

be expected at this point. The vital signs have been stable. The patient's 

condition is stable and appropriate for discharge from the emergency department.





The patient will pursue further outpatient evaluation with the primary care 

physician or other designated or consulting physician as outlined in the 

discharge instructions. The patient and/or caregivers are agreeable to this 

plan of care and follow-up instructions have been explained in detail. The 

patient and/or caregivers have received these instruction. The patient/and or 

caregivers are aware that any significant change in condition or worsening of 

symptoms should prompt an immediate return to this or the closest emergency 

department or call 911.

## 2022-08-30 ENCOUNTER — HOSPITAL ENCOUNTER (EMERGENCY)
Dept: HOSPITAL 33 - ED | Age: 67
Discharge: HOME | End: 2022-08-30
Payer: MEDICARE

## 2022-08-30 VITALS — HEART RATE: 78 BPM | DIASTOLIC BLOOD PRESSURE: 65 MMHG | SYSTOLIC BLOOD PRESSURE: 138 MMHG

## 2022-08-30 DIAGNOSIS — Z79.899: ICD-10-CM

## 2022-08-30 DIAGNOSIS — E78.5: ICD-10-CM

## 2022-08-30 DIAGNOSIS — I10: ICD-10-CM

## 2022-08-30 DIAGNOSIS — E11.9: ICD-10-CM

## 2022-08-30 DIAGNOSIS — R60.0: Primary | ICD-10-CM

## 2022-08-30 DIAGNOSIS — Z79.84: ICD-10-CM

## 2022-08-30 LAB
ALBUMIN SERPL-MCNC: 4.1 G/DL (ref 3.5–5)
ALP SERPL-CCNC: 48 U/L (ref 38–126)
ALT SERPL-CCNC: 22 U/L (ref 0–35)
ANION GAP SERPL CALC-SCNC: 10.3 MEQ/L (ref 5–15)
AST SERPL QL: 21 U/L (ref 14–36)
BASOPHILS # BLD AUTO: 0.07 X10^3/UL (ref 0–0.4)
BILIRUB BLD-MCNC: 0.2 MG/DL (ref 0.2–1.3)
BNP SERPL-MCNC: 110 PG/ML (ref 0–900)
BUN SERPL-MCNC: 33 MG/DL (ref 7–17)
CALCIUM SPEC-MCNC: 10.3 MG/DL (ref 8.4–10.2)
CHLORIDE SERPL-SCNC: 100 MMOL/L (ref 98–107)
CO2 SERPL-SCNC: 31 MMOL/L (ref 22–30)
CREAT SERPL-MCNC: 1.08 MG/DL (ref 0.52–1.04)
EOSINOPHIL # BLD AUTO: 0.27 X10^3/UL (ref 0–0.5)
GFR SERPLBLD BASED ON 1.73 SQ M-ARVRAT: 53.9 ML/MIN
GLUCOSE SERPL-MCNC: 134 MG/DL (ref 74–106)
HCT VFR BLD AUTO: 38.3 % (ref 35–47)
HGB BLD-MCNC: 11.5 G/DL (ref 12–16)
LYMPHOCYTES # SPEC AUTO: 2.42 X10^3/UL (ref 1–4.6)
MCH RBC QN AUTO: 27.2 PG (ref 26–32)
MCHC RBC AUTO-ENTMCNC: 30 G/DL (ref 32–36)
MONOCYTES # BLD AUTO: 0.55 X10^3/UL (ref 0–1.3)
PLATELET # BLD AUTO: 450 X10^3/UL (ref 150–450)
POTASSIUM SERPLBLD-SCNC: 4.3 MMOL/L (ref 3.5–5.1)
PROT SERPL-MCNC: 7.4 G/DL (ref 6.3–8.2)
RBC # BLD AUTO: 4.23 X10^6/UL (ref 4.1–5.4)
SODIUM SERPL-SCNC: 137 MMOL/L (ref 137–145)
WBC # BLD AUTO: 8.4 X10^3/UL (ref 4–10.5)

## 2022-08-30 PROCEDURE — 99283 EMERGENCY DEPT VISIT LOW MDM: CPT

## 2022-08-30 PROCEDURE — 36415 COLL VENOUS BLD VENIPUNCTURE: CPT

## 2022-08-30 PROCEDURE — 83880 ASSAY OF NATRIURETIC PEPTIDE: CPT

## 2022-08-30 PROCEDURE — 84484 ASSAY OF TROPONIN QUANT: CPT

## 2022-08-30 PROCEDURE — 85379 FIBRIN DEGRADATION QUANT: CPT

## 2022-08-30 PROCEDURE — 80053 COMPREHEN METABOLIC PANEL: CPT

## 2022-08-30 PROCEDURE — 85025 COMPLETE CBC W/AUTO DIFF WBC: CPT

## 2022-08-30 NOTE — ERPHSYRPT
- History of Present Illness


Source: patient


Exam Limitations: no limitations


Patient Subjective Stated Complaint: "My legs have been swollen for 2 weeks and 

my left leg is really swollen and they feel tight".


Triage Nursing Assessment: Pt presents to ER with complaints of bilateral lower 

extremity swelling x 2 weeks. Pt states left leg has been increasingly worse 

with swelling and her legs feel "tight". Pt denies pain. Is currently wearing 

compression stockings. Pt is alert and oriented x 3. Skin is pink, warm, and 

dry. Respirations are unlabored at this time. Pt is able to ambulate with slow 

gait.


Physician History: 





68 yo wf w B LE edema x 2 wks. Pt states that the pain is mild. She denies chest

pain/dyspnea/fever/injury. Pt has a h/o DM/HTN and states that she had a similar

problem in 2015.


Method of Injury: unknown (No injury)


Occurred: other (2 wks)


Quality: constant


Severity of Pain-Max: mild


Severity of Pain-Current: mild


Lower Extremities Pain: leg: bilateral (Mild)


Modifying Factors: Improves With: movement


Associated Symptoms: none


Allergies/Adverse Reactions: 








methadone Allergy (Verified 08/30/22 15:59)


   


ibuprofen [From Motrin] Adverse Reaction (Intermediate, Verified 08/30/22 15:59)


   abdominal pain


NSAIDS (Non-Steroidal Anti-Inflamma Adverse Reaction (Intermediate, Verified 

08/30/22 15:59)


   abdominal pain


acetaminophen [From Tylox] Adverse Reaction (Mild, Verified 08/30/22 15:59)


   Nausea


oxycodone HCl [From Tylox] Adverse Reaction (Mild, Verified 08/30/22 15:59)


   Nausea





Home Medications: 








Hydrocodone/Acetaminophen [Vicodin Hp Tablet] 1 each PO TID 05/02/12 [History]


Lisinopril 20 mg PO BID 05/02/12 [History]


Omeprazole 20 MG [Prilosec 20 mg] 40 mg PO DAILY 05/02/12 [History]


ALPRAZolam 1 MG*** [Xanax 1 mg***] 1 mg PO HS 05/19/17 [History]


Metformin HCl 500 mg*** [Glucophage 500 MG***] 500 mg PO BID 05/19/17 [History]


Dapagliflozin Propanediol [Farxiga] 10 mg PO DAILY 05/08/18 [History]


Potassium Chloride [Klor-Con M10] 10 meq PO DAILY 05/08/18 [History]


Atorvastatin Calcium [Lipitor] 40 mg PO DAILY 07/20/19 [History]





Hx Tetanus, Diphtheria Vaccination/Date Given: No


Hx Influenza Vaccination/Date Given: No


Hx Pneumococcal Vaccination/Date Given: No


Immunizations Up to Date: No





Travel Risk





- International Travel


Have you traveled outside of the country in past 3 weeks: No





- Coronavirus Screening


Are you exhibiting any of the following symptoms?: No


Close contact with a COVID-19 positive Pt in past 14-21 Days: No





- Vaccine Status


Have you recieved a Covid-19 vaccination: Yes


: Pfizer





- Vaccination Dates


Date of 2cond Vaccination (if applicable): 2021





- Review of Systems


Constitutional: No Symptoms


Eyes: No Symptoms


Ears, Nose, & Throat: No Symptoms


Respiratory: No Symptoms


Cardiac: No Symptoms


Abdominal/Gastrointestinal: No Symptoms


Genitourinary Symptoms: No Symptoms


Skin: No Symptoms


Neurological: No Symptoms


Psychological: No Symptoms


Endocrine: No Symptoms


Hematologic/Lymphatic: No Symptoms


Immunological/Allergic: No Symptoms





- Past Medical History


Pertinent Past Medical History: Yes


Neurological History: Peripheral Neuropathy


Cardiac History: High Cholesterol, Hypertension


Endocrine Medical History: Diabetes Type II


Musculoskeletal History: Osteoarthritis, Other


Psycho-Social History: Anxiety, Depression


Other Medical History: Chronic Pain





- Past Surgical History


Past Surgical History: Yes


Gastrointestinal: Cholecystectomy


Musculoskeletal: Orthopedic Surgery


Other Surgical History: BOTH KNEES





- Social History


Smoking Status: Never smoker


How long have you smoked: 22 years


Exposure to second hand smoke: No


Alcohol Use: None


Drug Use: none


Patient Lives Alone: Yes


Significant Family History: no pertinent family hx





- Nursing Vital Signs


Nursing Vital Signs: 


                               Initial Vital Signs











Temperature  96.3 F   08/30/22 15:54


 


Pulse Rate  91 H  08/30/22 15:54


 


Respiratory Rate  18   08/30/22 15:54


 


Blood Pressure  146/84   08/30/22 15:54


 


O2 Sat by Pulse Oximetry  97   08/30/22 15:54








                                   Pain Scale











Pain Intensity                 0











Mildly hypertensive





- Physical Exam


General Appearance: no apparent distress


Eyes, Ears, Nose, Throat Exam: normal ENT inspection, TMs normal, pharynx 

normal, moist mucous membranes


Neck Exam: normal inspection, non-tender, supple, full range of motion, No 

Brudzinski, No Kernig's, No meningismus, No carotid bruit


Cardiovascular/Respiratory Exam: normal breath sounds, regular rate/rhythm, 

heart sounds normal, No no respiratory distress


Gastrointestinal/Abdominal Exam: non-tender, soft


Back Exam: normal inspection, normal range of motion, No CVA tenderness, No 

vertebral tenderness


Hips Exam: bilateral: non-tender, normal inspection, normal range of motion, no 

evidence of injury


Legs Exam: bilateral leg: other (2+ B LE edema/NTTP/old scar R pre-tibial 

area/Chronic venous stasis changes L pretibial area/Good pedal pulses, distal 

sensation, and capillary return)


Ankle Exam: bilateral ankle: swelling (Mod B)


Foot Exam: bilateral foot: non-tender, normal inspection, normal range of 

motion, no evidence of injury


DTR - Lower Extremities Exam: knee (R): 2+, knee (L): 2+


Neuro/Tendon Exam: normal sensation, normal motor functions, normal tendon 

functions, responds to pain, no evidence tendon injury, No motor deficit, No 

sensory deficit


Mental Status Exam: alert, oriented x 3, cooperative


Skin Exam: normal color, warm, dry


**SpO2 Interpretation**: normal


SpO2: 97


O2 Delivery: Room Air





- Course


Nursing assessment & vital signs reviewed: Yes


Ordered Tests: 


                               Active Orders 24 hr











 Category Date Time Status


 


 CBC W DIFF Stat Lab  08/30/22 20:22 Completed


 


 CMP Stat Lab  08/30/22 20:22 Completed


 


 D-DIMER QUANTITATIVE Stat Lab  08/30/22 20:22 Completed


 


 NT PRO BNP Stat Lab  08/30/22 20:22 Completed


 


 TROPONIN Q4H Lab  08/30/22 20:30 Completed











Lab/Rad Data: 


                           Laboratory Result Diagrams





                                 08/30/22 20:22 





                                 08/30/22 20:22 





                               Laboratory Results











  08/30/22 08/30/22 08/30/22 Range/Units





  20:30 20:22 20:22 


 


WBC     (4.0-10.5)  x10^3/uL


 


RBC     (4.1-5.4)  x10^6/uL


 


Hgb     (12.0-16.0)  g/dL


 


Hct     (35-47)  %


 


MCV     ()  fL


 


MCH     (26-32)  pg


 


MCHC     (32-36)  g/dL


 


RDW     (11.5-14.0)  %


 


Plt Count     (150-450)  x10^3/uL


 


MPV     (7.5-11.0)  fL


 


Gran %     (36.0-66.0)  %


 


Immature Gran % (Auto)     (0.00-0.4)  %


 


Nucleat RBC Rel Count     (0.00-0.1)  %


 


Eos # (Auto)     (0-0.5)  x10^3/uL


 


Immature Gran # (Auto)     (0.00-0.03)  x10^3u/L


 


Absolute Lymphs (auto)     (1.0-4.6)  x10^3/uL


 


Absolute Monos (auto)     (0.0-1.3)  x10^3/uL


 


Absolute Nucleated RBC     (0.00-0.01)  x10^3u/L


 


Lymphocytes %     (24.0-44.0)  %


 


Monocytes %     (0.0-12.0)  %


 


Eosinophils %     (0.00-5.0)  %


 


Basophils %     (0.0-0.4)  %


 


Absolute Granulocytes     (1.4-6.9)  x10^3/uL


 


Basophils #     (0-0.4)  x10^3/uL


 


D-Dimer   0.39   (0.0-0.50)  mg/L


 


Sodium    137  (137-145)  mmol/L


 


Potassium    4.3  (3.5-5.1)  mmol/L


 


Chloride    100  ()  mmol/L


 


Carbon Dioxide    31 H  (22-30)  mmol/L


 


Anion Gap    10.3  (5-15)  MEQ/L


 


BUN    33 H  (7-17)  mg/dL


 


Creatinine    1.08 H  (0.52-1.04)  mg/dL


 


Estimated GFR    53.9  ML/MIN


 


Glucose    134 H  ()  mg/dL


 


Calcium    10.3 H  (8.4-10.2)  mg/dL


 


Total Bilirubin    0.20  (0.2-1.3)  mg/dL


 


AST    21  (14-36)  U/L


 


ALT    22  (0-35)  U/L


 


Alkaline Phosphatase    48  ()  U/L


 


Troponin I  < 0.012    (0.000-0.034)  ng/mL


 


NT-Pro-B Natriuret Pep    110  (0-900)  pg/mL


 


Serum Total Protein    7.4  (6.3-8.2)  g/dL


 


Albumin    4.1  (3.5-5.0)  g/dL














  08/30/22 Range/Units





  20:22 


 


WBC  8.4  (4.0-10.5)  x10^3/uL


 


RBC  4.23  (4.1-5.4)  x10^6/uL


 


Hgb  11.5 L  (12.0-16.0)  g/dL


 


Hct  38.3  (35-47)  %


 


MCV  90.5  ()  fL


 


MCH  27.2  (26-32)  pg


 


MCHC  30.0 L  (32-36)  g/dL


 


RDW  13.7  (11.5-14.0)  %


 


Plt Count  450  (150-450)  x10^3/uL


 


MPV  8.8  (7.5-11.0)  fL


 


Gran %  60.0  (36.0-66.0)  %


 


Immature Gran % (Auto)  0.4  (0.00-0.4)  %


 


Nucleat RBC Rel Count  0.0  (0.00-0.1)  %


 


Eos # (Auto)  0.27  (0-0.5)  x10^3/uL


 


Immature Gran # (Auto)  0.03  (0.00-0.03)  x10^3u/L


 


Absolute Lymphs (auto)  2.42  (1.0-4.6)  x10^3/uL


 


Absolute Monos (auto)  0.55  (0.0-1.3)  x10^3/uL


 


Absolute Nucleated RBC  0.00  (0.00-0.01)  x10^3u/L


 


Lymphocytes %  29.0  (24.0-44.0)  %


 


Monocytes %  6.6  (0.0-12.0)  %


 


Eosinophils %  3.2  (0.00-5.0)  %


 


Basophils %  0.8  (0.0-0.4)  %


 


Absolute Granulocytes  5.01  (1.4-6.9)  x10^3/uL


 


Basophils #  0.07  (0-0.4)  x10^3/uL


 


D-Dimer   (0.0-0.50)  mg/L


 


Sodium   (137-145)  mmol/L


 


Potassium   (3.5-5.1)  mmol/L


 


Chloride   ()  mmol/L


 


Carbon Dioxide   (22-30)  mmol/L


 


Anion Gap   (5-15)  MEQ/L


 


BUN   (7-17)  mg/dL


 


Creatinine   (0.52-1.04)  mg/dL


 


Estimated GFR   ML/MIN


 


Glucose   ()  mg/dL


 


Calcium   (8.4-10.2)  mg/dL


 


Total Bilirubin   (0.2-1.3)  mg/dL


 


AST   (14-36)  U/L


 


ALT   (0-35)  U/L


 


Alkaline Phosphatase   ()  U/L


 


Troponin I   (0.000-0.034)  ng/mL


 


NT-Pro-B Natriuret Pep   (0-900)  pg/mL


 


Serum Total Protein   (6.3-8.2)  g/dL


 


Albumin   (3.5-5.0)  g/dL














- Progress


Progress Note: 





08/30/22 22:52


Pt wo evidence of CHF and DD neg. Most likely dependent edema


Counseled pt/family regarding: lab results, diagnosis, need for follow-up





- Departure


Departure Disposition: Home


Clinical Impression: 


 Edema





Condition: Stable


Critical Care Time: No


Referrals: 


HAVEN RESTREPO MD [Primary Care Provider] - Follow up/PCP as directed


Instructions:  Dependent Edema (DC)


Additional Instructions: 


Follow up with your family MD in 1-2 days


Elevate legs


Get thigh high Jose Barr at pharmacy


Lasix once a day as needed


Prescriptions: 


Furosemide [Lasix] 20 mg PO DAILY PRN PRN #10 tablet


 PRN Reason: swelling

## 2022-08-31 VITALS — OXYGEN SATURATION: 97 %
